# Patient Record
Sex: FEMALE | Race: WHITE | URBAN - METROPOLITAN AREA
[De-identification: names, ages, dates, MRNs, and addresses within clinical notes are randomized per-mention and may not be internally consistent; named-entity substitution may affect disease eponyms.]

---

## 2020-07-25 ENCOUNTER — TELEPHONE ENCOUNTER (OUTPATIENT)
Dept: URBAN - METROPOLITAN AREA CLINIC 13 | Facility: CLINIC | Age: 65
End: 2020-07-25

## 2020-07-25 RX ORDER — OMEPRAZOLE 20 MG/1
TAKE 1 CAPSULE TWICE DAILY CAPSULE, DELAYED RELEASE ORAL
Qty: 60 | Refills: 11 | OUTPATIENT
End: 2012-01-31

## 2020-07-25 RX ORDER — OMEPRAZOLE 40 MG/1
TAKE 1 CAPSULE TWICE DAILY CAPSULE, DELAYED RELEASE ORAL
Qty: 60 | Refills: 6 | OUTPATIENT
Start: 2011-12-19 | End: 2012-01-31

## 2020-07-25 RX ORDER — CHLORDIAZEPOXIDE HYDROCHLORIDE AND CLIDINIUM BROMIDE 5; 2.5 MG/1; MG/1
TAKE 1 CAPSULE EVERY 4-6 HOURS AS NEEDED CAPSULE ORAL
Refills: 0 | OUTPATIENT
Start: 2006-04-19 | End: 2009-03-03

## 2020-07-25 RX ORDER — HYOSCYAMINE SULFATE 0.38 MG/1
TAKE 1 TABLET TWICE DAILY TABLET, EXTENDED RELEASE ORAL
Refills: 0 | OUTPATIENT
Start: 2006-04-19 | End: 2008-06-06

## 2020-07-26 ENCOUNTER — TELEPHONE ENCOUNTER (OUTPATIENT)
Dept: URBAN - METROPOLITAN AREA CLINIC 13 | Facility: CLINIC | Age: 65
End: 2020-07-26

## 2020-07-26 RX ORDER — POTASSIUM CHLORIDE 750 MG/1
TABLET, FILM COATED, EXTENDED RELEASE ORAL
Qty: 60 | Refills: 0 | Status: ACTIVE | COMMUNITY
Start: 2012-02-07

## 2020-07-26 RX ORDER — PREDNISONE 10 MG/1
TABLET ORAL
Qty: 6 | Refills: 0 | Status: ACTIVE | COMMUNITY
Start: 2010-12-20

## 2020-07-26 RX ORDER — DULOXETINE HCL 30 MG
CAPSULE,DELAYED RELEASE (ENTERIC COATED) ORAL
Qty: 30 | Refills: 0 | Status: ACTIVE | COMMUNITY
Start: 2011-06-27

## 2020-07-26 RX ORDER — IPRATROPIUM BROMIDE 0.5 MG/2.5ML
USE 1 UNIT DOSE VIA NEBULIZER  4 TIMES A DAY AS NEEDED SOLUTION RESPIRATORY (INHALATION)
Refills: 0 | Status: ACTIVE | COMMUNITY

## 2020-07-26 RX ORDER — ALBUTEROL SULFATE 90 UG/1
INHALE 2 PUFFS FOUR TIMES DAILY AS DIRECTED AEROSOL, METERED RESPIRATORY (INHALATION)
Refills: 0 | Status: ACTIVE | COMMUNITY

## 2020-07-26 RX ORDER — ESTROGENS, CONJUGATED 0.45 MG/1
TAKE 1 TABLET DAILY TABLET, FILM COATED ORAL
Refills: 0 | Status: ACTIVE | COMMUNITY

## 2020-07-26 RX ORDER — OMEPRAZOLE 20 MG/1
TAKE 1 CAPSULE TWICE A DAY INSURANCE WILL ONLY PAY FOR 60/MONTH CAPSULE, DELAYED RELEASE ORAL
Qty: 120 | Refills: 0 | Status: ACTIVE | COMMUNITY
Start: 2012-01-31

## 2020-07-26 RX ORDER — OMEPRAZOLE 20 MG/1
TAKE 1 CAPSULE BY MOUTH TWICE A DAY CAPSULE, DELAYED RELEASE ORAL
Qty: 60 | Refills: 0 | Status: ACTIVE | COMMUNITY
Start: 2013-08-04

## 2020-07-26 RX ORDER — ALBUTEROL SULFATE 90 UG/1
AEROSOL, METERED RESPIRATORY (INHALATION)
Qty: 18 | Refills: 0 | Status: ACTIVE | COMMUNITY
Start: 2011-06-15

## 2020-07-26 RX ORDER — LEVOFLOXACIN 500 MG/1
TABLET, FILM COATED ORAL
Qty: 7 | Refills: 0 | Status: ACTIVE | COMMUNITY
Start: 2012-09-04

## 2020-07-26 RX ORDER — BUDESONIDE AND FORMOTEROL FUMARATE DIHYDRATE 160; 4.5 UG/1; UG/1
USE AS DIRECTED AEROSOL RESPIRATORY (INHALATION)
Refills: 0 | Status: ACTIVE | COMMUNITY

## 2020-07-26 RX ORDER — FLUTICASONE PROPIONATE 50 UG/1
USE AS DIRECTED SPRAY, METERED NASAL
Refills: 0 | Status: ACTIVE | COMMUNITY

## 2020-07-26 RX ORDER — LOPERAMIDE HCL 2 MG/1
USE AS DIRECTED TABLET ORAL
Refills: 0 | Status: ACTIVE | COMMUNITY

## 2020-07-26 RX ORDER — DEXTROSE 4 G
TAKE 1 TABLET DAILY TABLET,CHEWABLE ORAL
Refills: 0 | Status: ACTIVE | COMMUNITY

## 2020-07-26 RX ORDER — OMEPRAZOLE 20 MG/1
TAKE 1 CAPSULE BY MOUTH TWICE A DAY CAPSULE, DELAYED RELEASE ORAL
Qty: 120 | Refills: 0 | Status: ACTIVE | COMMUNITY
Start: 2013-04-03

## 2020-07-26 RX ORDER — OMEPRAZOLE 20 MG/1
TAKE 1 CAPSULE BY MOUTH TWICE A DAY CAPSULE, DELAYED RELEASE ORAL
Qty: 60 | Refills: 0 | Status: ACTIVE | COMMUNITY
Start: 2013-09-04

## 2020-07-26 RX ORDER — ALPRAZOLAM 1 MG/1
TAKE 1 TABLET DAILY TABLET ORAL
Refills: 0 | Status: ACTIVE | COMMUNITY

## 2020-07-26 RX ORDER — ACETAMINOPHEN 500 MG/1
TAKE 1 TABLET EVERY 4 TO 6 HOURS AS NEEDED TABLET, FILM COATED ORAL
Refills: 0 | Status: ACTIVE | COMMUNITY

## 2020-07-26 RX ORDER — PREDNISONE 10 MG/1
TABLET ORAL
Qty: 36 | Refills: 0 | Status: ACTIVE | COMMUNITY
Start: 2012-09-04

## 2020-07-26 RX ORDER — ALBUTEROL SULFATE 1.25 MG/3ML
USE 1 UNIT DOSE IN NEBULIZER EVERY 4 TO 6 HOURS AS NEEDED SOLUTION INTRABRONCHIAL
Refills: 0 | Status: ACTIVE | COMMUNITY

## 2020-07-26 RX ORDER — FUROSEMIDE 20 MG/1
TAKE 1 TABLET DAILY TABLET ORAL
Refills: 0 | Status: ACTIVE | COMMUNITY

## 2020-07-26 RX ORDER — MULTIVITAMIN
TAKE 1 CAPSULE DAILY CAPSULE ORAL
Refills: 0 | Status: ACTIVE | COMMUNITY

## 2020-07-26 RX ORDER — METRONIDAZOLE 250 MG/1
TABLET ORAL
Qty: 12 | Refills: 0 | Status: ACTIVE | COMMUNITY
Start: 2010-12-20

## 2020-07-26 RX ORDER — METOPROLOL TARTRATE 50 MG/1
TAKE 1 TABLET DAILY TABLET, FILM COATED ORAL
Refills: 0 | Status: ACTIVE | COMMUNITY

## 2020-07-26 RX ORDER — HYOSCYAMINE SULFATE 0.12 MG/1
PLACE 1-2 TABLET EVERY 6 HOURS PRN ABD PAIN TABLET, ORALLY DISINTEGRATING ORAL
Qty: 90 | Refills: 1 | Status: ACTIVE | COMMUNITY
Start: 2011-12-19

## 2020-07-26 RX ORDER — TIOTROPIUM BROMIDE 18 UG/1
USE AS DIRECTED CAPSULE ORAL; RESPIRATORY (INHALATION)
Refills: 0 | Status: ACTIVE | COMMUNITY

## 2020-07-26 RX ORDER — DULOXETINE HCL 30 MG
TAKE 1 CAPSULE DAILY CAPSULE,DELAYED RELEASE (ENTERIC COATED) ORAL
Refills: 0 | Status: ACTIVE | COMMUNITY

## 2020-07-26 RX ORDER — ASCORBIC ACID 500 MG
TAKE 1 TABLET DAILY TABLET ORAL
Refills: 0 | Status: ACTIVE | COMMUNITY

## 2020-07-26 RX ORDER — OMEPRAZOLE 20 MG/1
TAKE 1 CAPSULE BY MOUTH TWICE A DAY CAPSULE, DELAYED RELEASE ORAL
Qty: 120 | Refills: 0 | Status: ACTIVE | COMMUNITY
Start: 2013-05-31

## 2022-10-27 ENCOUNTER — WEB ENCOUNTER (OUTPATIENT)
Dept: URBAN - METROPOLITAN AREA CLINIC 113 | Facility: CLINIC | Age: 67
End: 2022-10-27

## 2022-10-27 ENCOUNTER — LAB OUTSIDE AN ENCOUNTER (OUTPATIENT)
Dept: URBAN - METROPOLITAN AREA CLINIC 113 | Facility: CLINIC | Age: 67
End: 2022-10-27

## 2022-10-27 ENCOUNTER — OFFICE VISIT (OUTPATIENT)
Dept: URBAN - METROPOLITAN AREA CLINIC 113 | Facility: CLINIC | Age: 67
End: 2022-10-27
Payer: MEDICARE

## 2022-10-27 VITALS
SYSTOLIC BLOOD PRESSURE: 113 MMHG | WEIGHT: 152 LBS | RESPIRATION RATE: 24 BRPM | DIASTOLIC BLOOD PRESSURE: 63 MMHG | BODY MASS INDEX: 29.84 KG/M2 | HEART RATE: 52 BPM | HEIGHT: 60 IN | TEMPERATURE: 97.8 F

## 2022-10-27 DIAGNOSIS — Z80.0 FAMILY HISTORY OF COLON CANCER: ICD-10-CM

## 2022-10-27 DIAGNOSIS — K58.9 IRRITABLE BOWEL SYNDROME (IBS): ICD-10-CM

## 2022-10-27 DIAGNOSIS — R74.8 ELEVATED LIVER ENZYMES: ICD-10-CM

## 2022-10-27 DIAGNOSIS — R19.7 DIARRHEA: ICD-10-CM

## 2022-10-27 PROCEDURE — 99204 OFFICE O/P NEW MOD 45 MIN: CPT | Performed by: INTERNAL MEDICINE

## 2022-10-27 RX ORDER — ESTROGENS, CONJUGATED 0.45 MG/1
TAKE 1 TABLET DAILY TABLET, FILM COATED ORAL
Refills: 0 | Status: ACTIVE | COMMUNITY

## 2022-10-27 RX ORDER — ALBUTEROL SULFATE 1.25 MG/3ML
USE 1 UNIT DOSE IN NEBULIZER EVERY 4 TO 6 HOURS AS NEEDED SOLUTION INTRABRONCHIAL
Refills: 0 | Status: ACTIVE | COMMUNITY

## 2022-10-27 RX ORDER — ASCORBIC ACID 500 MG
TAKE 1 TABLET DAILY TABLET ORAL
Refills: 0 | Status: ACTIVE | COMMUNITY

## 2022-10-27 RX ORDER — DULOXETINE HCL 30 MG
TAKE 1 CAPSULE DAILY CAPSULE,DELAYED RELEASE (ENTERIC COATED) ORAL
Refills: 0 | Status: ACTIVE | COMMUNITY

## 2022-10-27 RX ORDER — BUDESONIDE AND FORMOTEROL FUMARATE DIHYDRATE 160; 4.5 UG/1; UG/1
USE AS DIRECTED AEROSOL RESPIRATORY (INHALATION)
Refills: 0 | Status: ACTIVE | COMMUNITY

## 2022-10-27 RX ORDER — LEVOFLOXACIN 500 MG/1
TABLET, FILM COATED ORAL
Qty: 7 | Refills: 0 | Status: ACTIVE | COMMUNITY
Start: 2012-09-04

## 2022-10-27 RX ORDER — POTASSIUM CHLORIDE 750 MG/1
TABLET, FILM COATED, EXTENDED RELEASE ORAL
Qty: 60 | Refills: 0 | Status: ACTIVE | COMMUNITY
Start: 2012-02-07

## 2022-10-27 RX ORDER — METRONIDAZOLE 250 MG/1
TABLET ORAL
Qty: 12 | Refills: 0 | Status: ACTIVE | COMMUNITY
Start: 2010-12-20

## 2022-10-27 RX ORDER — MULTIVITAMIN
TAKE 1 CAPSULE DAILY CAPSULE ORAL
Refills: 0 | Status: ACTIVE | COMMUNITY

## 2022-10-27 RX ORDER — HYOSCYAMINE SULFATE 0.12 MG/1
PLACE 1-2 TABLET EVERY 6 HOURS PRN ABD PAIN TABLET, ORALLY DISINTEGRATING ORAL
Qty: 90 | Refills: 1 | Status: ACTIVE | COMMUNITY
Start: 2011-12-19

## 2022-10-27 RX ORDER — TIOTROPIUM BROMIDE 18 UG/1
USE AS DIRECTED CAPSULE ORAL; RESPIRATORY (INHALATION)
Refills: 0 | Status: ACTIVE | COMMUNITY

## 2022-10-27 RX ORDER — IPRATROPIUM BROMIDE 0.5 MG/2.5ML
USE 1 UNIT DOSE VIA NEBULIZER  4 TIMES A DAY AS NEEDED SOLUTION RESPIRATORY (INHALATION)
Refills: 0 | Status: ACTIVE | COMMUNITY

## 2022-10-27 RX ORDER — PREDNISONE 10 MG/1
TABLET ORAL
Qty: 6 | Refills: 0 | Status: ACTIVE | COMMUNITY
Start: 2010-12-20

## 2022-10-27 RX ORDER — DEXTROSE 4 G
TAKE 1 TABLET DAILY TABLET,CHEWABLE ORAL
Refills: 0 | Status: ACTIVE | COMMUNITY

## 2022-10-27 RX ORDER — METOPROLOL TARTRATE 50 MG/1
TAKE 1 TABLET DAILY TABLET, FILM COATED ORAL
Refills: 0 | Status: ACTIVE | COMMUNITY

## 2022-10-27 RX ORDER — ALPRAZOLAM 1 MG/1
TAKE 1 TABLET DAILY TABLET ORAL
Refills: 0 | Status: ACTIVE | COMMUNITY

## 2022-10-27 RX ORDER — FLUTICASONE PROPIONATE 50 UG/1
USE AS DIRECTED SPRAY, METERED NASAL
Refills: 0 | Status: ACTIVE | COMMUNITY

## 2022-10-27 RX ORDER — ALBUTEROL SULFATE 90 UG/1
INHALE 2 PUFFS FOUR TIMES DAILY AS DIRECTED AEROSOL, METERED RESPIRATORY (INHALATION)
Refills: 0 | Status: ACTIVE | COMMUNITY

## 2022-10-27 RX ORDER — ACETAMINOPHEN 500 MG/1
TAKE 1 TABLET EVERY 4 TO 6 HOURS AS NEEDED TABLET, FILM COATED ORAL
Refills: 0 | Status: ACTIVE | COMMUNITY

## 2022-10-27 RX ORDER — LOPERAMIDE HCL 2 MG/1
USE AS DIRECTED TABLET ORAL
Refills: 0 | Status: ACTIVE | COMMUNITY

## 2022-10-27 RX ORDER — OMEPRAZOLE 20 MG/1
TAKE 1 CAPSULE TWICE A DAY INSURANCE WILL ONLY PAY FOR 60/MONTH CAPSULE, DELAYED RELEASE ORAL
Qty: 120 | Refills: 0 | Status: ACTIVE | COMMUNITY
Start: 2012-01-31

## 2022-10-27 RX ORDER — FUROSEMIDE 20 MG/1
TAKE 1 TABLET DAILY TABLET ORAL
Refills: 0 | Status: ACTIVE | COMMUNITY

## 2022-10-27 NOTE — HPI-TODAY'S VISIT:
Very pleasant 67-year-old female with a history of severe irritable bowel syndrome presents for office follow-up.   . She has an elevated liver test.  She saw Dr. Chen.  He stopped her Depakote and Zyprexa.  The LFTs normalized.  Hx of longstanding irritable bowel syndrome.  No rectal bleeding or melena.  Daily loose stools.  She states irritable bowel syndrome is better these days.  Reflux is not as much of a problem for her.  No nausea or vomiting.  No hematemesis.  No rectal bleeding or melena.  Weight is relatively stable. . She had a severe viral viral pneumonitis back in 2010.  She had ARDS.  She had a trach.  Now she is on longstanding oxygen.  Her pulmonary function is actually relatively stable.  No cough problems.  She is not short of breath at rest in the office today.  She is wearing oxygen. . Colonoscopy 2008.  Normal . EGD 2002.  Mild gastritis.  Duodenal biopsies performed.  Duodenal biopsies normal.  Gastritis noted.  No H. pylori. . Colonoscopy 1999.  Normal. Slight inflammation of terminal ileum and ascending colon. Descending colon biopsies were normal. . Flexible sigmoidoscopy 1998.  Normal to 60 cm. Normal mucosa . Labs September 2022.  Total bilirubin 0.5, AST 21, ALT 19, alkaline phosphatase 110, hemoglobin 13.8, platelet count 216,000, . Labs July 2022.  AST 30, ALT 30, alkaline phosphatase 121. . CT abdomen and pelvis with contrast June 2022.  Previous cholecystectomy.  Normal common bile duct.  Normal liver.  Prior hysterectomy noted.  Tiny pulmonary nodule. . Labs May 2022.  AST 80, , alkaline phosphatase 225, hepatitis serologies negative . Abdominal ultrasound May 2022.  Normal liver.  Status post cholecystectomy.  Common bile duct normal. . Labs April 2022.  AST 82, , alkaline phosphatase 267, TSH 1.2

## 2022-10-28 LAB
FERRITIN, SERUM: 142
IMMUNOGLOBULIN A: 199
IMMUNOGLOBULIN A: 199
IMMUNOGLOBULIN G: 984
IMMUNOGLOBULIN M: 134
INTERPRETATION: (no result)
TISSUE TRANSGLUTAMINASE AB, IGA: <1

## 2022-12-12 ENCOUNTER — TELEPHONE ENCOUNTER (OUTPATIENT)
Dept: URBAN - METROPOLITAN AREA CLINIC 113 | Facility: CLINIC | Age: 67
End: 2022-12-12

## 2022-12-14 ENCOUNTER — OFFICE VISIT (OUTPATIENT)
Dept: URBAN - METROPOLITAN AREA MEDICAL CENTER 19 | Facility: MEDICAL CENTER | Age: 67
End: 2022-12-14
Payer: MEDICARE

## 2022-12-14 DIAGNOSIS — K64.8 BLEEDING INTERNAL HEMORRHOIDS: ICD-10-CM

## 2022-12-14 DIAGNOSIS — Z80.0 BROTHER AT YOUNG AGE FAMILY HISTORY OF COLON CANCER: ICD-10-CM

## 2022-12-14 DIAGNOSIS — D12.3 ADENOMA OF TRANSVERSE COLON: ICD-10-CM

## 2022-12-14 DIAGNOSIS — D12.0 ADENOMA OF CECUM: ICD-10-CM

## 2022-12-14 DIAGNOSIS — R19.7 ACUTE DIARRHEA: ICD-10-CM

## 2022-12-14 PROCEDURE — 45385 COLONOSCOPY W/LESION REMOVAL: CPT | Performed by: INTERNAL MEDICINE

## 2022-12-14 PROCEDURE — 45380 COLONOSCOPY AND BIOPSY: CPT | Performed by: INTERNAL MEDICINE

## 2022-12-14 RX ORDER — DULOXETINE HCL 30 MG
TAKE 1 CAPSULE DAILY CAPSULE,DELAYED RELEASE (ENTERIC COATED) ORAL
Refills: 0 | Status: ACTIVE | COMMUNITY

## 2022-12-14 RX ORDER — FUROSEMIDE 20 MG/1
TAKE 1 TABLET DAILY TABLET ORAL
Refills: 0 | Status: ACTIVE | COMMUNITY

## 2022-12-14 RX ORDER — POTASSIUM CHLORIDE 750 MG/1
TABLET, FILM COATED, EXTENDED RELEASE ORAL
Qty: 60 | Refills: 0 | Status: ACTIVE | COMMUNITY
Start: 2012-02-07

## 2022-12-14 RX ORDER — ALBUTEROL SULFATE 1.25 MG/3ML
USE 1 UNIT DOSE IN NEBULIZER EVERY 4 TO 6 HOURS AS NEEDED SOLUTION INTRABRONCHIAL
Refills: 0 | Status: ACTIVE | COMMUNITY

## 2022-12-14 RX ORDER — IPRATROPIUM BROMIDE 0.5 MG/2.5ML
USE 1 UNIT DOSE VIA NEBULIZER  4 TIMES A DAY AS NEEDED SOLUTION RESPIRATORY (INHALATION)
Refills: 0 | Status: ACTIVE | COMMUNITY

## 2022-12-14 RX ORDER — DEXTROSE 4 G
TAKE 1 TABLET DAILY TABLET,CHEWABLE ORAL
Refills: 0 | Status: ACTIVE | COMMUNITY

## 2022-12-14 RX ORDER — FLUTICASONE PROPIONATE 50 UG/1
USE AS DIRECTED SPRAY, METERED NASAL
Refills: 0 | Status: ACTIVE | COMMUNITY

## 2022-12-14 RX ORDER — ESTROGENS, CONJUGATED 0.45 MG/1
TAKE 1 TABLET DAILY TABLET, FILM COATED ORAL
Refills: 0 | Status: ACTIVE | COMMUNITY

## 2022-12-14 RX ORDER — METRONIDAZOLE 250 MG/1
TABLET ORAL
Qty: 12 | Refills: 0 | Status: ACTIVE | COMMUNITY
Start: 2010-12-20

## 2022-12-14 RX ORDER — OMEPRAZOLE 20 MG/1
TAKE 1 CAPSULE TWICE A DAY INSURANCE WILL ONLY PAY FOR 60/MONTH CAPSULE, DELAYED RELEASE ORAL
Qty: 120 | Refills: 0 | Status: ACTIVE | COMMUNITY
Start: 2012-01-31

## 2022-12-14 RX ORDER — ALBUTEROL SULFATE 90 UG/1
INHALE 2 PUFFS FOUR TIMES DAILY AS DIRECTED AEROSOL, METERED RESPIRATORY (INHALATION)
Refills: 0 | Status: ACTIVE | COMMUNITY

## 2022-12-14 RX ORDER — TIOTROPIUM BROMIDE 18 UG/1
USE AS DIRECTED CAPSULE ORAL; RESPIRATORY (INHALATION)
Refills: 0 | Status: ACTIVE | COMMUNITY

## 2022-12-14 RX ORDER — HYOSCYAMINE SULFATE 0.12 MG/1
PLACE 1-2 TABLET EVERY 6 HOURS PRN ABD PAIN TABLET, ORALLY DISINTEGRATING ORAL
Qty: 90 | Refills: 1 | Status: ACTIVE | COMMUNITY
Start: 2011-12-19

## 2022-12-14 RX ORDER — ACETAMINOPHEN 500 MG/1
TAKE 1 TABLET EVERY 4 TO 6 HOURS AS NEEDED TABLET, FILM COATED ORAL
Refills: 0 | Status: ACTIVE | COMMUNITY

## 2022-12-14 RX ORDER — METOPROLOL TARTRATE 50 MG/1
TAKE 1 TABLET DAILY TABLET, FILM COATED ORAL
Refills: 0 | Status: ACTIVE | COMMUNITY

## 2022-12-14 RX ORDER — LEVOFLOXACIN 500 MG/1
TABLET, FILM COATED ORAL
Qty: 7 | Refills: 0 | Status: ACTIVE | COMMUNITY
Start: 2012-09-04

## 2022-12-14 RX ORDER — PREDNISONE 10 MG/1
TABLET ORAL
Qty: 6 | Refills: 0 | Status: ACTIVE | COMMUNITY
Start: 2010-12-20

## 2022-12-14 RX ORDER — ALPRAZOLAM 1 MG/1
TAKE 1 TABLET DAILY TABLET ORAL
Refills: 0 | Status: ACTIVE | COMMUNITY

## 2022-12-14 RX ORDER — LOPERAMIDE HCL 2 MG/1
USE AS DIRECTED TABLET ORAL
Refills: 0 | Status: ACTIVE | COMMUNITY

## 2022-12-14 RX ORDER — BUDESONIDE AND FORMOTEROL FUMARATE DIHYDRATE 160; 4.5 UG/1; UG/1
USE AS DIRECTED AEROSOL RESPIRATORY (INHALATION)
Refills: 0 | Status: ACTIVE | COMMUNITY

## 2022-12-14 RX ORDER — ASCORBIC ACID 500 MG
TAKE 1 TABLET DAILY TABLET ORAL
Refills: 0 | Status: ACTIVE | COMMUNITY

## 2022-12-14 RX ORDER — MULTIVITAMIN
TAKE 1 CAPSULE DAILY CAPSULE ORAL
Refills: 0 | Status: ACTIVE | COMMUNITY

## 2022-12-20 ENCOUNTER — TELEPHONE ENCOUNTER (OUTPATIENT)
Dept: URBAN - METROPOLITAN AREA CLINIC 113 | Facility: CLINIC | Age: 67
End: 2022-12-20

## 2022-12-20 RX ORDER — BUDESONIDE 3 MG/1
3 CAPSULES CAPSULE ORAL ONCE A DAY
Qty: 90 | Refills: 2 | OUTPATIENT
Start: 2022-12-20

## 2023-02-27 ENCOUNTER — OFFICE VISIT (OUTPATIENT)
Dept: URBAN - METROPOLITAN AREA CLINIC 113 | Facility: CLINIC | Age: 68
End: 2023-02-27
Payer: MEDICARE

## 2023-02-27 VITALS — HEIGHT: 60 IN | WEIGHT: 151 LBS | TEMPERATURE: 97.3 F | BODY MASS INDEX: 29.64 KG/M2 | RESPIRATION RATE: 18 BRPM

## 2023-02-27 DIAGNOSIS — K52.831 COLLAGENOUS COLITIS: ICD-10-CM

## 2023-02-27 DIAGNOSIS — R19.7 DIARRHEA: ICD-10-CM

## 2023-02-27 DIAGNOSIS — K58.9 IRRITABLE BOWEL SYNDROME (IBS): ICD-10-CM

## 2023-02-27 DIAGNOSIS — Z80.0 FAMILY HISTORY OF COLON CANCER: ICD-10-CM

## 2023-02-27 DIAGNOSIS — R74.8 ELEVATED LIVER ENZYMES: ICD-10-CM

## 2023-02-27 PROCEDURE — 99214 OFFICE O/P EST MOD 30 MIN: CPT | Performed by: INTERNAL MEDICINE

## 2023-02-27 RX ORDER — ASCORBIC ACID 500 MG
TAKE 1 TABLET DAILY TABLET ORAL
Refills: 0 | Status: ACTIVE | COMMUNITY

## 2023-02-27 RX ORDER — BUDESONIDE 3 MG/1
3 CAPSULES CAPSULE ORAL ONCE A DAY
Qty: 90 | Refills: 2 | Status: ACTIVE | COMMUNITY
Start: 2022-12-20

## 2023-02-27 RX ORDER — DEXTROSE 4 G
TAKE 1 TABLET DAILY TABLET,CHEWABLE ORAL
Refills: 0 | Status: ACTIVE | COMMUNITY

## 2023-02-27 RX ORDER — AMLODIPINE BESYLATE 5 MG/1
1 TABLET TABLET ORAL ONCE A DAY
Status: ACTIVE | COMMUNITY

## 2023-02-27 RX ORDER — BUDESONIDE AND FORMOTEROL FUMARATE DIHYDRATE 160; 4.5 UG/1; UG/1
USE AS DIRECTED AEROSOL RESPIRATORY (INHALATION)
Refills: 0 | Status: ON HOLD | COMMUNITY

## 2023-02-27 RX ORDER — FLUTICASONE PROPIONATE 50 UG/1
USE AS DIRECTED SPRAY, METERED NASAL
Refills: 0 | Status: ACTIVE | COMMUNITY

## 2023-02-27 RX ORDER — IPRATROPIUM BROMIDE 0.5 MG/2.5ML
USE 1 UNIT DOSE VIA NEBULIZER  4 TIMES A DAY AS NEEDED SOLUTION RESPIRATORY (INHALATION)
Refills: 0 | Status: ACTIVE | COMMUNITY

## 2023-02-27 RX ORDER — POTASSIUM CHLORIDE 750 MG/1
TABLET, FILM COATED, EXTENDED RELEASE ORAL
Qty: 60 | Refills: 0 | Status: ACTIVE | COMMUNITY
Start: 2012-02-07

## 2023-02-27 RX ORDER — METRONIDAZOLE 250 MG/1
TABLET ORAL
Qty: 12 | Refills: 0 | Status: ACTIVE | COMMUNITY
Start: 2010-12-20

## 2023-02-27 RX ORDER — FUROSEMIDE 20 MG/1
TAKE 1 TABLET DAILY TABLET ORAL
Refills: 0 | Status: ACTIVE | COMMUNITY

## 2023-02-27 RX ORDER — DULOXETINE HCL 30 MG
TAKE 1 CAPSULE DAILY CAPSULE,DELAYED RELEASE (ENTERIC COATED) ORAL
Refills: 0 | Status: ACTIVE | COMMUNITY

## 2023-02-27 RX ORDER — ACETAMINOPHEN 500 MG/1
TAKE 1 TABLET EVERY 4 TO 6 HOURS AS NEEDED TABLET, FILM COATED ORAL
Refills: 0 | Status: ACTIVE | COMMUNITY

## 2023-02-27 RX ORDER — OMEPRAZOLE 20 MG/1
TAKE 1 CAPSULE TWICE A DAY INSURANCE WILL ONLY PAY FOR 60/MONTH CAPSULE, DELAYED RELEASE ORAL
Qty: 120 | Refills: 0 | Status: ACTIVE | COMMUNITY
Start: 2012-01-31

## 2023-02-27 RX ORDER — ALBUTEROL SULFATE 1.25 MG/3ML
USE 1 UNIT DOSE IN NEBULIZER EVERY 4 TO 6 HOURS AS NEEDED SOLUTION INTRABRONCHIAL
Refills: 0 | Status: ACTIVE | COMMUNITY

## 2023-02-27 RX ORDER — HYOSCYAMINE SULFATE 0.12 MG/1
PLACE 1-2 TABLET EVERY 6 HOURS PRN ABD PAIN TABLET, ORALLY DISINTEGRATING ORAL
Qty: 90 | Refills: 1 | Status: ACTIVE | COMMUNITY
Start: 2011-12-19

## 2023-02-27 RX ORDER — ESTROGENS, CONJUGATED 0.45 MG/1
TAKE 1 TABLET DAILY TABLET, FILM COATED ORAL
Refills: 0 | Status: ACTIVE | COMMUNITY

## 2023-02-27 RX ORDER — LOPERAMIDE HCL 2 MG/1
USE AS DIRECTED TABLET ORAL
Refills: 0 | Status: ACTIVE | COMMUNITY

## 2023-02-27 RX ORDER — PREDNISONE 10 MG/1
TABLET ORAL
Qty: 6 | Refills: 0 | Status: ACTIVE | COMMUNITY
Start: 2010-12-20

## 2023-02-27 RX ORDER — ALPRAZOLAM 1 MG/1
TAKE 1 TABLET DAILY TABLET ORAL
Refills: 0 | Status: ACTIVE | COMMUNITY

## 2023-02-27 RX ORDER — SERTRALINE 100 MG/1
1 TABLET TABLET, FILM COATED ORAL ONCE A DAY
Status: ACTIVE | COMMUNITY

## 2023-02-27 RX ORDER — LEVOFLOXACIN 500 MG/1
TABLET, FILM COATED ORAL
Qty: 7 | Refills: 0 | Status: ACTIVE | COMMUNITY
Start: 2012-09-04

## 2023-02-27 RX ORDER — MULTIVITAMIN
TAKE 1 CAPSULE DAILY CAPSULE ORAL
Refills: 0 | Status: ACTIVE | COMMUNITY

## 2023-02-27 RX ORDER — METOPROLOL TARTRATE 50 MG/1
TAKE 1 TABLET DAILY TABLET, FILM COATED ORAL
Refills: 0 | Status: ACTIVE | COMMUNITY

## 2023-02-27 RX ORDER — ALBUTEROL SULFATE 90 UG/1
INHALE 2 PUFFS FOUR TIMES DAILY AS DIRECTED AEROSOL, METERED RESPIRATORY (INHALATION)
Refills: 0 | Status: ACTIVE | COMMUNITY

## 2023-02-27 RX ORDER — TIOTROPIUM BROMIDE 18 UG/1
USE AS DIRECTED CAPSULE ORAL; RESPIRATORY (INHALATION)
Refills: 0 | Status: ACTIVE | COMMUNITY

## 2023-02-27 NOTE — HPI-TODAY'S VISIT:
Very pleasant 67-year-old female with a history of severe irritable bowel syndrome presents for office follow-up.   . Since her last office visit she had a colonoscopy and she was diagnosed with colitis colitis.  Treated with budesonide.  Thankfully her diarrhea is much improved.  She follows the FODMAP diet.  She is found that avoiding lactose helps quite a bit.  She is not having any issues with rectal bleeding or melena.  Clearly symptoms are much improved at this time.  Weight is relatively stable. . She had elevated liver tests.  She saw Dr. Chen.  He stopped her Depakote and Zyprexa.  The LFTs normalized.   . Hx of longstanding irritable bowel syndrome.  In the past, severe. . She has eliminated lactose and this has helped her chronic diarrhea issue considerably. . She had a severe viral viral pneumonitis back in 2010.  She had ARDS.  She had a trach.  Now she is on longstanding oxygen.  Her pulmonary function is actually relatively stable.  No cough problems.  She is on home O2.

## 2023-02-27 NOTE — HPI-TODAY'S VISIT:
Colonoscopy December 2022.  4 distinct 4 mm transverse and cecal polyps.  All tubular adenomas.  Random biopsies obtained.  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  Ascending biopsies were normal. . Oct 2022. Celiac serology negative. . Labs September 2022.  Total bilirubin 0.5, AST 21, ALT 19, alkaline phosphatase 110, hemoglobin 13.8, platelet count 216,000, . Labs July 2022.  AST 30, ALT 30, alkaline phosphatase 121. . CT abdomen and pelvis with contrast June 2022.  Previous cholecystectomy.  Normal common bile duct.  Normal liver.  Prior hysterectomy noted.  Tiny pulmonary nodule. . Labs May 2022.  AST 80, , alkaline phosphatase 225, hepatitis serologies negative . Abdominal ultrasound May 2022.  Normal liver.  Status post cholecystectomy.  Common bile duct normal. . Labs April 2022.  AST 82, , alkaline phosphatase 267, TSH 1.2. Colonoscopy 2008.  Normal . EGD 2002.  Mild gastritis.  Duodenal biopsies performed.  Duodenal biopsies normal.  Gastritis noted.  No H. pylori. . Colonoscopy 1999.  Normal. Slight inflammation of terminal ileum and ascending colon. Descending colon biopsies were normal. . Flexible sigmoidoscopy 1998.  Normal to 60 cm. Normal mucosa .

## 2023-02-28 PROBLEM — 19311003 COLLAGENOUS COLITIS: Status: ACTIVE | Noted: 2023-02-27

## 2023-02-28 PROBLEM — 312824007 FAMILY HISTORY OF CANCER OF COLON (SITUATION): Status: ACTIVE | Noted: 2022-10-27

## 2023-02-28 PROBLEM — 707724006 ELEVATED LIVER ENZYMES LEVEL: Status: ACTIVE | Noted: 2022-10-27

## 2023-02-28 PROBLEM — 62315008 DIARRHEA: Status: ACTIVE | Noted: 2022-10-27

## 2023-02-28 PROBLEM — 10743008 IRRITABLE BOWEL SYNDROME: Status: ACTIVE | Noted: 2022-10-27

## 2023-04-20 ENCOUNTER — ERX REFILL RESPONSE (OUTPATIENT)
Dept: URBAN - METROPOLITAN AREA CLINIC 113 | Facility: CLINIC | Age: 68
End: 2023-04-20

## 2023-04-20 RX ORDER — BUDESONIDE 3 MG/1
3 CAPSULES CAPSULE ORAL ONCE A DAY
Qty: 90 | Refills: 2 | OUTPATIENT

## 2023-04-20 RX ORDER — BUDESONIDE 3 MG/1
TAKE 3 CAPSULES BY MOUTH EVERY DAY, DECREASE TO TWO (2) CAPSULES IN FOUR (4) WEEKS CAPSULE, COATED PELLETS ORAL
Qty: 90 BLISTER | Refills: 2 | OUTPATIENT

## 2023-06-19 ENCOUNTER — LAB OUTSIDE AN ENCOUNTER (OUTPATIENT)
Dept: URBAN - METROPOLITAN AREA CLINIC 113 | Facility: CLINIC | Age: 68
End: 2023-06-19

## 2023-06-19 ENCOUNTER — TELEPHONE ENCOUNTER (OUTPATIENT)
Dept: URBAN - METROPOLITAN AREA CLINIC 113 | Facility: CLINIC | Age: 68
End: 2023-06-19

## 2023-06-23 ENCOUNTER — TELEPHONE ENCOUNTER (OUTPATIENT)
Dept: URBAN - METROPOLITAN AREA CLINIC 13 | Facility: CLINIC | Age: 68
End: 2023-06-23

## 2023-07-25 ENCOUNTER — TELEPHONE ENCOUNTER (OUTPATIENT)
Dept: URBAN - METROPOLITAN AREA CLINIC 113 | Facility: CLINIC | Age: 68
End: 2023-07-25

## 2023-07-25 ENCOUNTER — LAB OUTSIDE AN ENCOUNTER (OUTPATIENT)
Dept: URBAN - METROPOLITAN AREA CLINIC 113 | Facility: CLINIC | Age: 68
End: 2023-07-25

## 2023-08-03 ENCOUNTER — LAB OUTSIDE AN ENCOUNTER (OUTPATIENT)
Dept: URBAN - METROPOLITAN AREA CLINIC 113 | Facility: CLINIC | Age: 68
End: 2023-08-03

## 2023-08-03 ENCOUNTER — TELEPHONE ENCOUNTER (OUTPATIENT)
Dept: URBAN - METROPOLITAN AREA CLINIC 113 | Facility: CLINIC | Age: 68
End: 2023-08-03

## 2023-08-14 ENCOUNTER — LAB OUTSIDE AN ENCOUNTER (OUTPATIENT)
Dept: URBAN - METROPOLITAN AREA CLINIC 113 | Facility: CLINIC | Age: 68
End: 2023-08-14

## 2023-08-14 ENCOUNTER — WEB ENCOUNTER (OUTPATIENT)
Dept: URBAN - METROPOLITAN AREA CLINIC 113 | Facility: CLINIC | Age: 68
End: 2023-08-14

## 2023-08-14 ENCOUNTER — OFFICE VISIT (OUTPATIENT)
Dept: URBAN - METROPOLITAN AREA CLINIC 113 | Facility: CLINIC | Age: 68
End: 2023-08-14
Payer: MEDICARE

## 2023-08-14 VITALS
WEIGHT: 149 LBS | TEMPERATURE: 97.7 F | BODY MASS INDEX: 29.25 KG/M2 | HEART RATE: 56 BPM | HEIGHT: 60 IN | DIASTOLIC BLOOD PRESSURE: 55 MMHG | RESPIRATION RATE: 18 BRPM | SYSTOLIC BLOOD PRESSURE: 114 MMHG

## 2023-08-14 DIAGNOSIS — R01.1 HEART MURMUR: ICD-10-CM

## 2023-08-14 DIAGNOSIS — R10.84 GENERALIZED ABDOMINAL PAIN: ICD-10-CM

## 2023-08-14 DIAGNOSIS — R74.8 ELEVATED LIVER ENZYMES: ICD-10-CM

## 2023-08-14 PROCEDURE — 99214 OFFICE O/P EST MOD 30 MIN: CPT | Performed by: INTERNAL MEDICINE

## 2023-08-14 RX ORDER — ASCORBIC ACID 500 MG
TAKE 1 TABLET DAILY TABLET ORAL
Refills: 0 | Status: ACTIVE | COMMUNITY

## 2023-08-14 RX ORDER — METOPROLOL TARTRATE 50 MG/1
TAKE 1 TABLET DAILY TABLET, FILM COATED ORAL
Refills: 0 | Status: ACTIVE | COMMUNITY

## 2023-08-14 RX ORDER — ALBUTEROL SULFATE 1.25 MG/3ML
USE 1 UNIT DOSE IN NEBULIZER EVERY 4 TO 6 HOURS AS NEEDED SOLUTION INTRABRONCHIAL
Refills: 0 | Status: ACTIVE | COMMUNITY

## 2023-08-14 RX ORDER — OMEPRAZOLE 20 MG/1
TAKE 1 CAPSULE TWICE A DAY INSURANCE WILL ONLY PAY FOR 60/MONTH CAPSULE, DELAYED RELEASE ORAL
Qty: 120 | Refills: 0 | Status: ACTIVE | COMMUNITY
Start: 2012-01-31

## 2023-08-14 RX ORDER — MULTIVITAMIN
TAKE 1 CAPSULE DAILY CAPSULE ORAL
Refills: 0 | Status: ACTIVE | COMMUNITY

## 2023-08-14 RX ORDER — AMLODIPINE BESYLATE 5 MG/1
1 TABLET TABLET ORAL ONCE A DAY
Status: ACTIVE | COMMUNITY

## 2023-08-14 RX ORDER — ALBUTEROL SULFATE 90 UG/1
INHALE 2 PUFFS FOUR TIMES DAILY AS DIRECTED AEROSOL, METERED RESPIRATORY (INHALATION)
Refills: 0 | Status: ACTIVE | COMMUNITY

## 2023-08-14 RX ORDER — BUDESONIDE AND FORMOTEROL FUMARATE DIHYDRATE 160; 4.5 UG/1; UG/1
USE AS DIRECTED AEROSOL RESPIRATORY (INHALATION)
Refills: 0 | Status: ACTIVE | COMMUNITY

## 2023-08-14 RX ORDER — FLUTICASONE PROPIONATE 50 UG/1
USE AS DIRECTED SPRAY, METERED NASAL
Refills: 0 | Status: ACTIVE | COMMUNITY

## 2023-08-14 RX ORDER — ALPRAZOLAM 1 MG/1
TAKE 1 TABLET DAILY TABLET ORAL
Refills: 0 | Status: ACTIVE | COMMUNITY

## 2023-08-14 RX ORDER — BUDESONIDE 3 MG/1
TAKE 3 CAPSULES BY MOUTH EVERY DAY, DECREASE TO TWO (2) CAPSULES IN FOUR (4) WEEKS CAPSULE, COATED PELLETS ORAL
Qty: 90 BLISTER | Refills: 2 | Status: ACTIVE | COMMUNITY

## 2023-08-14 RX ORDER — FLUOXETINE 20 MG/1
1 CAPSULE CAPSULE ORAL ONCE A DAY
Status: ACTIVE | COMMUNITY

## 2023-08-14 RX ORDER — DICYCLOMINE HYDROCHLORIDE 20 MG/1
1 TABLET TABLET ORAL THREE TIMES A DAY
Status: ACTIVE | COMMUNITY

## 2023-08-14 NOTE — HPI-TODAY'S VISIT:
Very pleasant 68-year-old female with a history of severe irritable bowel syndrome presents for office follow-up.   . She is no longer on the seizure medications.  She has intermittent generalized periumbilical abdominal pain which comes and goes.  She does not have heartburn or regurgitation.  She still has considerable issues with chronic cough with sputum production.  She is short of breath at baseline.  She states her diarrhea is better.  There is no rectal bleeding or melena.  Weight is stable.  She remains on budesonide 2 tablets/day. . She had elevated liver tests.  She saw Dr. Chen.  He stopped her Depakote and Zyprexa.  The LFTs normalized.   . Hx of longstanding irritable bowel syndrome.  In the past, severe. . She has eliminated lactose and this has helped her chronic diarrhea issue considerably. . She had a severe viral viral pneumonitis back in 2010.  She had ARDS.  She had a trach.  Now she is on longstanding oxygen.  Her pulmonary function is actually relatively stable.  No cough problems.  She is on home O2.

## 2023-08-14 NOTE — HPI-TODAY'S VISIT:
. Colonoscopy December 2022.  4 distinct 4 mm transverse and cecal polyps.  All tubular adenomas.  Random biopsies obtained.  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  Ascending biopsies were normal. . Oct 2022. Celiac serology negative. . Labs September 2022.  Total bilirubin 0.5, AST 21, ALT 19, alkaline phosphatase 110, hemoglobin 13.8, platelet count 216,000, . Labs July 2022.  AST 30, ALT 30, alkaline phosphatase 121. . CT abdomen and pelvis with contrast June 2022.  Previous cholecystectomy.  Normal common bile duct.  Normal liver.  Prior hysterectomy noted.  Tiny pulmonary nodule. . Labs May 2022.  AST 80, , alkaline phosphatase 225, hepatitis serologies negative . Abdominal ultrasound May 2022.  Normal liver.  Status post cholecystectomy.  Common bile duct normal. . Labs April 2022.  AST 82, , alkaline phosphatase 267, TSH 1.2. Colonoscopy 2008.  Normal . EGD 2002.  Mild gastritis.  Duodenal biopsies performed.  Duodenal biopsies normal.  Gastritis noted.  No H. pylori. . Colonoscopy 1999.  Normal. Slight inflammation of terminal ileum and ascending colon. Descending colon biopsies were normal. . Flexible sigmoidoscopy 1998.  Normal to 60 cm. Normal mucosa . CT abdomen with contrast.  Mild intrahepatic ductal dilation.  No intrahepatic mass.  Common bile duct 9 mm.  No other abnormalities noted. . July 31, 2023.  Total bilirubin 0.5.  Alk phos is 119, AST 25.  TSH 1.2.  Hemoglobin 13.0.  Platelet count 202,000.  .  CRP 0.6.  Creatinine 1.1.  Prothrombin time 12.7. . Labs July 21, 2023.  , .  Alkaline phosphatase 240.  Total bili 1.2. . Abdominal ultrasound June 2023.  Normal.  Gallbladder surgically absent.  Common bile duct 4 mm. . June 1, 2023.  Alk phos is 388.  .  .

## 2023-09-20 ENCOUNTER — TELEPHONE ENCOUNTER (OUTPATIENT)
Dept: URBAN - METROPOLITAN AREA CLINIC 113 | Facility: CLINIC | Age: 68
End: 2023-09-20

## 2024-01-23 ENCOUNTER — OFFICE VISIT (OUTPATIENT)
Dept: URBAN - METROPOLITAN AREA CLINIC 113 | Facility: CLINIC | Age: 69
End: 2024-01-23

## 2024-04-01 ENCOUNTER — OV EP (OUTPATIENT)
Dept: URBAN - METROPOLITAN AREA CLINIC 113 | Facility: CLINIC | Age: 69
End: 2024-04-01
Payer: MEDICARE

## 2024-04-01 VITALS
SYSTOLIC BLOOD PRESSURE: 117 MMHG | HEIGHT: 60 IN | HEART RATE: 46 BPM | RESPIRATION RATE: 22 BRPM | TEMPERATURE: 97.5 F | WEIGHT: 149.6 LBS | DIASTOLIC BLOOD PRESSURE: 45 MMHG | BODY MASS INDEX: 29.37 KG/M2

## 2024-04-01 DIAGNOSIS — R19.7 DIARRHEA: ICD-10-CM

## 2024-04-01 DIAGNOSIS — K58.1 IBS: ICD-10-CM

## 2024-04-01 DIAGNOSIS — K52.831 COLLAGENOUS COLITIS: ICD-10-CM

## 2024-04-01 DIAGNOSIS — R10.84 GENERALIZED ABDOMINAL PAIN: ICD-10-CM

## 2024-04-01 PROCEDURE — 99214 OFFICE O/P EST MOD 30 MIN: CPT | Performed by: INTERNAL MEDICINE

## 2024-04-01 RX ORDER — MULTIVITAMIN
TAKE 1 CAPSULE DAILY CAPSULE ORAL
Refills: 0 | Status: ACTIVE | COMMUNITY

## 2024-04-01 RX ORDER — ASCORBIC ACID 500 MG
TAKE 1 TABLET DAILY TABLET ORAL
Refills: 0 | Status: ACTIVE | COMMUNITY

## 2024-04-01 RX ORDER — ALPRAZOLAM 1 MG/1
1 TABLET TABLET ORAL THREE TIMES A DAY
Refills: 0 | Status: ACTIVE | COMMUNITY

## 2024-04-01 RX ORDER — ALBUTEROL SULFATE 1.25 MG/3ML
USE 1 UNIT DOSE IN NEBULIZER EVERY 4 TO 6 HOURS AS NEEDED SOLUTION INTRABRONCHIAL
Refills: 0 | Status: ACTIVE | COMMUNITY

## 2024-04-01 RX ORDER — METOPROLOL TARTRATE 50 MG/1
TAKE 1 TABLET DAILY TABLET, FILM COATED ORAL
Refills: 0 | Status: ACTIVE | COMMUNITY

## 2024-04-01 RX ORDER — DICYCLOMINE HYDROCHLORIDE 20 MG/1
1 TABLET TABLET ORAL THREE TIMES A DAY
Status: ACTIVE | COMMUNITY

## 2024-04-01 RX ORDER — BUDESONIDE AND FORMOTEROL FUMARATE DIHYDRATE 160; 4.5 UG/1; UG/1
USE AS DIRECTED AEROSOL RESPIRATORY (INHALATION)
Refills: 0 | Status: ACTIVE | COMMUNITY

## 2024-04-01 RX ORDER — FLUTICASONE PROPIONATE 50 UG/1
USE AS DIRECTED SPRAY, METERED NASAL
Refills: 0 | Status: ACTIVE | COMMUNITY

## 2024-04-01 RX ORDER — ALBUTEROL SULFATE 90 UG/1
INHALE 2 PUFFS FOUR TIMES DAILY AS DIRECTED AEROSOL, METERED RESPIRATORY (INHALATION)
Refills: 0 | Status: ACTIVE | COMMUNITY

## 2024-04-01 RX ORDER — AMLODIPINE BESYLATE 5 MG/1
1 TABLET TABLET ORAL ONCE A DAY
Status: ACTIVE | COMMUNITY

## 2024-04-01 RX ORDER — BUDESONIDE 3 MG/1
TAKE 3 CAPSULES BY MOUTH EVERY DAY, DECREASE TO TWO (2) CAPSULES IN FOUR (4) WEEKS CAPSULE, COATED PELLETS ORAL
Qty: 90 BLISTER | Refills: 2 | Status: ACTIVE | COMMUNITY

## 2024-04-01 RX ORDER — FLUOXETINE 20 MG/1
1 CAPSULE CAPSULE ORAL ONCE A DAY
Status: ACTIVE | COMMUNITY

## 2024-04-01 RX ORDER — OMEPRAZOLE 20 MG/1
TAKE 1 CAPSULE TWICE A DAY INSURANCE WILL ONLY PAY FOR 60/MONTH CAPSULE, DELAYED RELEASE ORAL
Qty: 120 | Refills: 0 | Status: ACTIVE | COMMUNITY
Start: 2012-01-31

## 2024-04-01 NOTE — HPI-TODAY'S VISIT:
. January 2024. CMP notable for alkaline phosphatase 308. Total bilirubin 0.5. AST 71, ALT 98.  Labs September 2023. Hemoglobin 13.0. Platelet count 186,000. ALT 31, AST 34. Alkaline phosphatase 67. Total bilirubin 1.0.  September 2023. Carotid ultrasound study negative for carotid artery stenosis.  September 2023. MRI brain normal.  CT scan of pelvis with contrast September 2023. Liver normal. Gallbladder surgically absent. Pancreas normal. No mention of common bile duct dilation.  2023 CT abdomen with contrast. Mild intrahepatic ductal dilation. No intrahepatic mass. Common bile duct 9 mm. No other abnormalities noted.  July 31, 2023. Total bilirubin 0.5. Alk phos is 119, AST 25. TSH 1.2. Hemoglobin 13.0. Platelet count 202,000. . CRP 0.6. Creatinine 1.1. Prothrombin time 12.7.  Labs July 21, 2023. , . Alkaline phosphatase 240. Total bili 1.2.  Abdominal ultrasound June 2023. Normal. Gallbladder surgically absent. Common bile duct 4 mm.  June 1, 2023. Alk phos is 388. .   CT scan abdomen with contrast. August 2023. No acute abdominal findings. 9 mm common bile duct.  July 2023. Labs. Normal IgG at 995. Anti-LK M-. Anti-smooth muscle antibody negative. Hepatitis serologies negative. Antimitochondrial antibody negative.  Abdominal ultrasound July 2023. Unremarkable. Gallbladder surgically absent. Common bile duct 4 mm in size.  Colonoscopy December 2022.  4 distinct 4 mm transverse and cecal polyps.  All tubular adenomas.  Random biopsies obtained.  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  Ascending biopsies were normal.  Oct 2022. Celiac serology negative.  Labs September 2022.  Total bilirubin 0.5, AST 21, ALT 19, alkaline phosphatase 110, hemoglobin 13.8, platelet count 216,000,  Labs July 2022.  AST 30, ALT 30, alkaline phosphatase 121.  CT abdomen and pelvis with contrast June 2022.  Previous cholecystectomy.  Normal common bile duct.  Normal liver.  Prior hysterectomy noted.  Tiny pulmonary nodule.  Labs May 2022.  AST 80, , alkaline phosphatase 225, hepatitis serologies negative  CT chest with contrast. June 2022. Focal masslike thickening of the distal esophagus with calcification. 13 mm x 10 mm.  Abdominal ultrasound May 2022.  Normal liver.  Status post cholecystectomy.  Common bile duct normal.  Labs April 2022.  AST 82, , alkaline phosphatase 267, TSH 1.2.  Colonoscopy 2008. Normal  EGD 2002. Mild gastritis. Duodenal biopsies performed. Duodenal biopsies normal. Gastritis noted. No H. pylori.  Colonoscopy 1999. Normal. Slight inflammation of terminal ileum and ascending colon. Descending colon biopsies were normal.  Flexible sigmoidoscopy 1998. Normal to 60 cm. Normal mucosa

## 2024-04-01 NOTE — HPI-TODAY'S VISIT:
Very pleasant 68-year-old female with a history of severe irritable bowel syndrome presents for office follow-up.   . Today she is doing fine. She did well in the fall and winter 2023. Recently she has been having some problems with periodic epigastric and right upper quadrant discomfort. No dysphagia. Shortness of breath is stable. She is on home oxygen but looks very comfortable today. No cough or hemoptysis. No nausea or vomiting. Weight is stable. No diarrhea or constipation. No rectal bleeding or melena. Her diarrhea had resolved as I understand. I do not think she was on budesonide. Then the diarrhea returned. Her primary care doctor put her on budesonide. However she is only taking 1 tablet a day. At this time she is not having diarrhea issues. . She had elevated liver tests.  She saw Dr. Chen.  He stopped her Depakote and Zyprexa.  The LFTs normalized.   . Hx of longstanding irritable bowel syndrome.  In the past, severe. . She has eliminated lactose and this has helped her chronic diarrhea issue considerably. . She had a severe viral viral pneumonitis back in 2010.  She had ARDS.  She had a trach.  Now she is on longstanding oxygen.  Her pulmonary function is actually relatively stable.  No cough problems.  She is on home O2.

## 2024-04-01 NOTE — PHYSICAL EXAM CHEST:
no lesions,  no deformities,  no traumatic injuries,  no significant scars are present,  chest wall non-tender,  no masses present, breathing is unlabored without accessory muscle use,normal breath sounds Yes Yes Yes

## 2024-04-03 ENCOUNTER — LAB (OUTPATIENT)
Dept: URBAN - METROPOLITAN AREA CLINIC 113 | Facility: CLINIC | Age: 69
End: 2024-04-03

## 2024-04-29 ENCOUNTER — EUS (OUTPATIENT)
Dept: URBAN - METROPOLITAN AREA MEDICAL CENTER 19 | Facility: MEDICAL CENTER | Age: 69
End: 2024-04-29

## 2024-04-29 RX ORDER — ALPRAZOLAM 1 MG/1
1 TABLET TABLET ORAL THREE TIMES A DAY
Refills: 0 | Status: ACTIVE | COMMUNITY

## 2024-04-29 RX ORDER — ALBUTEROL SULFATE 90 UG/1
INHALE 2 PUFFS FOUR TIMES DAILY AS DIRECTED AEROSOL, METERED RESPIRATORY (INHALATION)
Refills: 0 | Status: ACTIVE | COMMUNITY

## 2024-04-29 RX ORDER — ALBUTEROL SULFATE 1.25 MG/3ML
USE 1 UNIT DOSE IN NEBULIZER EVERY 4 TO 6 HOURS AS NEEDED SOLUTION INTRABRONCHIAL
Refills: 0 | Status: ACTIVE | COMMUNITY

## 2024-04-29 RX ORDER — MULTIVITAMIN
TAKE 1 CAPSULE DAILY CAPSULE ORAL
Refills: 0 | Status: ACTIVE | COMMUNITY

## 2024-04-29 RX ORDER — FLUOXETINE 20 MG/1
1 CAPSULE CAPSULE ORAL ONCE A DAY
Status: ACTIVE | COMMUNITY

## 2024-04-29 RX ORDER — OMEPRAZOLE 20 MG/1
TAKE 1 CAPSULE TWICE A DAY INSURANCE WILL ONLY PAY FOR 60/MONTH CAPSULE, DELAYED RELEASE ORAL
Qty: 120 | Refills: 0 | Status: ACTIVE | COMMUNITY
Start: 2012-01-31

## 2024-04-29 RX ORDER — BUDESONIDE 3 MG/1
TAKE 3 CAPSULES BY MOUTH EVERY DAY, DECREASE TO TWO (2) CAPSULES IN FOUR (4) WEEKS CAPSULE, COATED PELLETS ORAL
Qty: 90 BLISTER | Refills: 2 | Status: ACTIVE | COMMUNITY

## 2024-04-29 RX ORDER — ASCORBIC ACID 500 MG
TAKE 1 TABLET DAILY TABLET ORAL
Refills: 0 | Status: ACTIVE | COMMUNITY

## 2024-04-29 RX ORDER — DICYCLOMINE HYDROCHLORIDE 20 MG/1
1 TABLET TABLET ORAL THREE TIMES A DAY
Status: ACTIVE | COMMUNITY

## 2024-04-29 RX ORDER — BUDESONIDE AND FORMOTEROL FUMARATE DIHYDRATE 160; 4.5 UG/1; UG/1
USE AS DIRECTED AEROSOL RESPIRATORY (INHALATION)
Refills: 0 | Status: ACTIVE | COMMUNITY

## 2024-04-29 RX ORDER — FLUTICASONE PROPIONATE 50 UG/1
USE AS DIRECTED SPRAY, METERED NASAL
Refills: 0 | Status: ACTIVE | COMMUNITY

## 2024-04-29 RX ORDER — AMLODIPINE BESYLATE 5 MG/1
1 TABLET TABLET ORAL ONCE A DAY
Status: ACTIVE | COMMUNITY

## 2024-04-29 RX ORDER — METOPROLOL TARTRATE 50 MG/1
TAKE 1 TABLET DAILY TABLET, FILM COATED ORAL
Refills: 0 | Status: ACTIVE | COMMUNITY

## 2024-05-06 ENCOUNTER — TELEPHONE ENCOUNTER (OUTPATIENT)
Dept: URBAN - METROPOLITAN AREA CLINIC 113 | Facility: CLINIC | Age: 69
End: 2024-05-06

## 2024-09-05 ENCOUNTER — OFFICE VISIT (OUTPATIENT)
Dept: URBAN - METROPOLITAN AREA CLINIC 113 | Facility: CLINIC | Age: 69
End: 2024-09-05

## 2024-09-19 ENCOUNTER — LAB OUTSIDE AN ENCOUNTER (OUTPATIENT)
Dept: URBAN - METROPOLITAN AREA CLINIC 113 | Facility: CLINIC | Age: 69
End: 2024-09-19

## 2024-09-19 ENCOUNTER — OFFICE VISIT (OUTPATIENT)
Dept: URBAN - METROPOLITAN AREA CLINIC 113 | Facility: CLINIC | Age: 69
End: 2024-09-19
Payer: MEDICARE

## 2024-09-19 ENCOUNTER — DASHBOARD ENCOUNTERS (OUTPATIENT)
Age: 69
End: 2024-09-19

## 2024-09-19 VITALS
DIASTOLIC BLOOD PRESSURE: 73 MMHG | TEMPERATURE: 97.2 F | SYSTOLIC BLOOD PRESSURE: 147 MMHG | WEIGHT: 161 LBS | HEART RATE: 57 BPM | HEIGHT: 60 IN | BODY MASS INDEX: 31.61 KG/M2 | RESPIRATION RATE: 22 BRPM

## 2024-09-19 DIAGNOSIS — R74.8 ELEVATED LIVER ENZYMES: ICD-10-CM

## 2024-09-19 DIAGNOSIS — R19.7 DIARRHEA OF PRESUMED INFECTIOUS ORIGIN: ICD-10-CM

## 2024-09-19 DIAGNOSIS — R10.84 GENERALIZED ABDOMINAL PAIN: ICD-10-CM

## 2024-09-19 PROCEDURE — 99214 OFFICE O/P EST MOD 30 MIN: CPT | Performed by: NURSE PRACTITIONER

## 2024-09-19 RX ORDER — BUDESONIDE 3 MG/1
TAKE 3 CAPSULES BY MOUTH EVERY DAY, DECREASE TO TWO (2) CAPSULES IN FOUR (4) WEEKS CAPSULE, COATED PELLETS ORAL
Qty: 90 BLISTER | Refills: 2 | Status: ACTIVE | COMMUNITY

## 2024-09-19 RX ORDER — BUDESONIDE AND FORMOTEROL FUMARATE DIHYDRATE 160; 4.5 UG/1; UG/1
USE AS DIRECTED AEROSOL RESPIRATORY (INHALATION)
Refills: 0 | Status: ACTIVE | COMMUNITY

## 2024-09-19 RX ORDER — FLUOXETINE HYDROCHLORIDE 10 MG/1
1 CAPSULE CAPSULE ORAL ONCE A DAY
Status: ACTIVE | COMMUNITY

## 2024-09-19 RX ORDER — METOPROLOL TARTRATE 50 MG/1
TAKE 1 TABLET DAILY TABLET, FILM COATED ORAL
Refills: 0 | Status: ACTIVE | COMMUNITY

## 2024-09-19 RX ORDER — DICYCLOMINE HYDROCHLORIDE 20 MG/1
1 TABLET TABLET ORAL THREE TIMES A DAY
Status: ACTIVE | COMMUNITY

## 2024-09-19 RX ORDER — OMEPRAZOLE 20 MG/1
TAKE 1 CAPSULE TWICE A DAY INSURANCE WILL ONLY PAY FOR 60/MONTH CAPSULE, DELAYED RELEASE ORAL
Qty: 120 | Refills: 0 | Status: ACTIVE | COMMUNITY
Start: 2012-01-31

## 2024-09-19 RX ORDER — ASCORBIC ACID 500 MG
TAKE 1 TABLET DAILY TABLET ORAL
Refills: 0 | Status: ACTIVE | COMMUNITY

## 2024-09-19 RX ORDER — FLUTICASONE PROPIONATE 50 UG/1
USE AS DIRECTED SPRAY, METERED NASAL
Refills: 0 | Status: ACTIVE | COMMUNITY

## 2024-09-19 RX ORDER — ALBUTEROL SULFATE 1.25 MG/3ML
USE 1 UNIT DOSE IN NEBULIZER EVERY 4 TO 6 HOURS AS NEEDED SOLUTION RESPIRATORY (INHALATION)
Refills: 0 | Status: ACTIVE | COMMUNITY

## 2024-09-19 RX ORDER — AMLODIPINE BESYLATE 5 MG/1
1 TABLET TABLET ORAL ONCE A DAY
Status: ACTIVE | COMMUNITY

## 2024-09-19 RX ORDER — ALBUTEROL SULFATE 90 UG/1
INHALE 2 PUFFS FOUR TIMES DAILY AS DIRECTED AEROSOL, METERED RESPIRATORY (INHALATION)
Refills: 0 | Status: ACTIVE | COMMUNITY

## 2024-09-19 RX ORDER — MULTIVITAMIN
TAKE 1 CAPSULE DAILY CAPSULE ORAL
Refills: 0 | Status: ACTIVE | COMMUNITY

## 2024-09-19 RX ORDER — ALPRAZOLAM 1 MG/1
1 TABLET TABLET ORAL THREE TIMES A DAY
Refills: 0 | Status: ACTIVE | COMMUNITY

## 2024-09-19 NOTE — HPI-TODAY'S VISIT:
She was seen in the office in April for follow-up regarding elevated liver enzymes.  Previous serologic workup was negative for viral or autoimmune hepatitis.  Previous CT demonstrated mild intrahepatic duct dilation.  An upper endoscopic ultrasound was planned.  Her abdominal pain and diarrhea related to irritable bowel syndrome had improved with dietary modification. EUS 4/29/2024 by Dr. Cavanaugh:Normal esophagus.  Gastric mucosal atrophy and antral gastritis with mucosal changes suggestive of intestinal metaplasia.  Normal examined duodenum.  Normal ampulla.  No significant pathology in the common bile duct.  Normal postcholecystectomy caliber of the extrahepatic bile duct without stones or sludge.  Nondilated main pancreatic duct.  Diffusely hyperechoic appearance of the liver suggestive of fatty infiltration.  Gastric biopsies showed mild chronic inactive inflammation, negative for H. pylori or intestinal metaplasia. Her diarrhea has been worsening. She is experiencing multiple loose, occasionally urgent stools each day. This typically responds to Imodium AD, which she is having to take more regularly. She reports associated abdominal discomfort and bloating, but denies blood in the stool. She remains on budesonide but cannot recall the dose. She is unsure if she has had any recent medication changes or been on antibiotics. She currently resides at [facility]. Recent labs with her PCP- 9/5/2024:H/H12.9/39.1, MCV 96, , WBC 8.8.  , , , T. bili 0.4, CMP otherwise unremarkable.  TSH 1.09. 6/17/2024:H/H12.5/36.9, MCV 98, , WBC 10.1.  AST 19, ALT 39, , T. bili 0.2, CMP otherwise unremarkable.  TSH 1.020.

## 2024-09-19 NOTE — HPI-OTHER HISTORIES
She had elevated liver tests. She saw Dr. Chen. He stopped her Depakote and Zyprexa. The LFTs normalized.  Hx of longstanding irritable bowel syndrome, previously improved with eliminating lactose.   She had a severe viral viral pneumonitis back in 2010. She had ARDS. She had a trach. Now she is on longstanding oxygen. Her pulmonary function is actually relatively stable. No cough problems. She is on home O2.  January 2024. CMP notable for alkaline phosphatase 308. Total bilirubin 0.5. AST 71, ALT 98.  Labs September 2023. Hemoglobin 13.0. Platelet count 186,000. ALT 31, AST 34. Alkaline phosphatase 67. Total bilirubin 1.0.  September 2023. Carotid ultrasound study negative for carotid artery stenosis.  September 2023. MRI brain normal.  CT scan of pelvis with contrast September 2023. Liver normal. Gallbladder surgically absent. Pancreas normal. No mention of common bile duct dilation.  2023 CT abdomen with contrast. Mild intrahepatic ductal dilation. No intrahepatic mass. Common bile duct 9 mm. No other abnormalities noted.  July 31, 2023. Total bilirubin 0.5. Alk phos is 119, AST 25. TSH 1.2. Hemoglobin 13.0. Platelet count 202,000. . CRP 0.6. Creatinine 1.1. Prothrombin time 12.7.  Labs July 21, 2023. , . Alkaline phosphatase 240. Total bili 1.2.  Abdominal ultrasound June 2023. Normal. Gallbladder surgically absent. Common bile duct 4 mm.  June 1, 2023. Alk phos is 388. .   CT scan abdomen with contrast. August 2023. No acute abdominal findings. 9 mm common bile duct.  July 2023. Labs. Normal IgG at 995. Anti-LK M-. Anti-smooth muscle antibody negative. Hepatitis serologies negative. Antimitochondrial antibody negative.  Abdominal ultrasound July 2023. Unremarkable. Gallbladder surgically absent. Common bile duct 4 mm in size.  Colonoscopy December 2022. 4 distinct 4 mm transverse and cecal polyps. All tubular adenomas. Random biopsies obtained. 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 Ascending biopsies were normal.  Oct 2022. Celiac serology negative.  Labs September 2022. Total bilirubin 0.5, AST 21, ALT 19, alkaline phosphatase 110, hemoglobin 13.8, platelet count 216,000,  Labs July 2022. AST 30, ALT 30, alkaline phosphatase 121.  CT abdomen and pelvis with contrast June 2022. Previous cholecystectomy. Normal common bile duct. Normal liver. Prior hysterectomy noted. Tiny pulmonary nodule.  Labs May 2022. AST 80, , alkaline phosphatase 225, hepatitis serologies negative  CT chest with contrast. June 2022. Focal masslike thickening of the distal esophagus with calcification. 13 mm x 10 mm.  Abdominal ultrasound May 2022. Normal liver. Status post cholecystectomy. Common bile duct normal.  Labs April 2022. AST 82, , alkaline phosphatase 267, TSH 1.2.  Colonoscopy 2008. Normal  EGD 2002. Mild gastritis. Duodenal biopsies performed. Duodenal biopsies normal. Gastritis noted. No H. pylori.  Colonoscopy 1999. Normal. Slight inflammation of terminal ileum and ascending colon. Descending colon biopsies were normal.  Flexible sigmoidoscopy 1998. Normal to 60 cm. Normal mucosa

## 2024-09-26 LAB
A/G RATIO: 1.4
ABSOLUTE BASOPHILS: 30
ABSOLUTE EOSINOPHILS: 121
ABSOLUTE LYMPHOCYTES: 1980
ABSOLUTE MONOCYTES: 525
ABSOLUTE NEUTROPHILS: 7444
ALBUMIN: 4.2
ALKALINE PHOSPHATASE: 133
ALT (SGPT): 20
AST (SGOT): 19
BASOPHILS: 0.3
BILIRUBIN, TOTAL: 0.5
BUN/CREATININE RATIO: (no result)
BUN: 20
CALCIUM: 9.4
CARBON DIOXIDE, TOTAL: 32
CHLORIDE: 99
CK-BB: (no result)
CREATINE KINASE, TOTAL: 33
CREATININE: 1
DNA (DS) ANTIBODY: <1
EGFR: 62
EOSINOPHILS: 1.2
GLOBULIN, TOTAL: 2.9
GLUCOSE: 94
HEMATOCRIT: 38.2
HEMOGLOBIN: 12.6
HISTONE ANTIBODIES: <1
LYMPHOCYTES: 19.6
MCH: 32.5
MCHC: 33
MCV: 98.5
MONOCYTES: 5.2
MPV: 10
NEUTROPHILS: 73.7
PLATELET COUNT: 215
POTASSIUM: 4.5
PROTEIN, TOTAL: 7.1
RDW: 13
RED BLOOD CELL COUNT: 3.88
SODIUM: 141
WHITE BLOOD CELL COUNT: 10.1

## 2024-12-19 ENCOUNTER — LAB OUTSIDE AN ENCOUNTER (OUTPATIENT)
Dept: URBAN - METROPOLITAN AREA CLINIC 113 | Facility: CLINIC | Age: 69
End: 2024-12-19

## 2024-12-19 ENCOUNTER — OFFICE VISIT (OUTPATIENT)
Dept: URBAN - METROPOLITAN AREA CLINIC 113 | Facility: CLINIC | Age: 69
End: 2024-12-19
Payer: MEDICARE

## 2024-12-19 ENCOUNTER — TELEPHONE ENCOUNTER (OUTPATIENT)
Dept: URBAN - METROPOLITAN AREA CLINIC 113 | Facility: CLINIC | Age: 69
End: 2024-12-19

## 2024-12-19 VITALS
BODY MASS INDEX: 32 KG/M2 | HEIGHT: 60 IN | TEMPERATURE: 98.1 F | RESPIRATION RATE: 18 BRPM | SYSTOLIC BLOOD PRESSURE: 116 MMHG | DIASTOLIC BLOOD PRESSURE: 67 MMHG | WEIGHT: 163 LBS | HEART RATE: 51 BPM

## 2024-12-19 DIAGNOSIS — Z80.0 FAMILY HISTORY OF COLON CANCER: ICD-10-CM

## 2024-12-19 DIAGNOSIS — R74.8 ELEVATED LIVER ENZYMES: ICD-10-CM

## 2024-12-19 DIAGNOSIS — K58.9 IRRITABLE BOWEL SYNDROME (IBS): ICD-10-CM

## 2024-12-19 DIAGNOSIS — R10.84 GENERALIZED ABDOMINAL PAIN: ICD-10-CM

## 2024-12-19 DIAGNOSIS — R19.7 DIARRHEA: ICD-10-CM

## 2024-12-19 DIAGNOSIS — K52.831 COLLAGENOUS COLITIS: ICD-10-CM

## 2024-12-19 PROCEDURE — 99214 OFFICE O/P EST MOD 30 MIN: CPT | Performed by: INTERNAL MEDICINE

## 2024-12-19 RX ORDER — ALPRAZOLAM 1 MG/1
1 TABLET TABLET ORAL THREE TIMES A DAY
Refills: 0 | Status: ACTIVE | COMMUNITY

## 2024-12-19 RX ORDER — FLUTICASONE PROPIONATE 50 UG/1
USE AS DIRECTED SPRAY, METERED NASAL
Refills: 0 | Status: ACTIVE | COMMUNITY

## 2024-12-19 RX ORDER — ALBUTEROL SULFATE 1.25 MG/3ML
USE 1 UNIT DOSE IN NEBULIZER EVERY 4 TO 6 HOURS AS NEEDED SOLUTION RESPIRATORY (INHALATION)
Refills: 0 | Status: ACTIVE | COMMUNITY

## 2024-12-19 RX ORDER — ASCORBIC ACID 500 MG
TAKE 1 TABLET DAILY TABLET ORAL
Refills: 0 | Status: ACTIVE | COMMUNITY

## 2024-12-19 RX ORDER — MULTIVITAMIN
TAKE 1 CAPSULE DAILY CAPSULE ORAL
Refills: 0 | Status: ACTIVE | COMMUNITY

## 2024-12-19 RX ORDER — METOPROLOL TARTRATE 50 MG/1
TAKE 1 TABLET DAILY TABLET, FILM COATED ORAL
Refills: 0 | Status: ACTIVE | COMMUNITY

## 2024-12-19 RX ORDER — DICYCLOMINE HYDROCHLORIDE 20 MG/1
1 TABLET TABLET ORAL THREE TIMES A DAY
Status: ACTIVE | COMMUNITY

## 2024-12-19 RX ORDER — ALBUTEROL SULFATE 90 UG/1
INHALE 2 PUFFS FOUR TIMES DAILY AS DIRECTED AEROSOL, METERED RESPIRATORY (INHALATION)
Refills: 0 | Status: ACTIVE | COMMUNITY

## 2024-12-19 RX ORDER — OMEPRAZOLE 20 MG/1
TAKE 1 CAPSULE TWICE A DAY INSURANCE WILL ONLY PAY FOR 60/MONTH CAPSULE, DELAYED RELEASE ORAL
Qty: 120 | Refills: 0 | Status: ACTIVE | COMMUNITY
Start: 2012-01-31

## 2024-12-19 RX ORDER — BUDESONIDE 3 MG/1
TAKE 3 CAPSULES BY MOUTH EVERY DAY, DECREASE TO TWO (2) CAPSULES IN FOUR (4) WEEKS CAPSULE, COATED PELLETS ORAL
Qty: 90 BLISTER | Refills: 2 | Status: ACTIVE | COMMUNITY

## 2024-12-19 RX ORDER — FLUOXETINE HYDROCHLORIDE 10 MG/1
1 CAPSULE CAPSULE ORAL ONCE A DAY
Status: ACTIVE | COMMUNITY

## 2024-12-19 RX ORDER — COLESEVELAM HYDROCHLORIDE 625 MG/1
1 TABLET TABLET, COATED ORAL ONCE DAILY
Qty: 30 | Refills: 11 | OUTPATIENT
Start: 2024-12-19

## 2024-12-19 RX ORDER — AMLODIPINE BESYLATE 5 MG/1
1 TABLET TABLET ORAL ONCE A DAY
Status: ACTIVE | COMMUNITY

## 2024-12-19 RX ORDER — BUDESONIDE AND FORMOTEROL FUMARATE DIHYDRATE 160; 4.5 UG/1; UG/1
USE AS DIRECTED AEROSOL RESPIRATORY (INHALATION)
Refills: 0 | Status: ACTIVE | COMMUNITY

## 2024-12-19 NOTE — HPI-TODAY'S VISIT:
70yo female with a history of ARDS on supplementation oxygen, IBS-D, presenting for follow-up.  At this point, she is not having any issues with abdominal pain. No dysphagia or regurgitation. She still has intermittent diarrhea. Alternating with constipation. It seems of the diarrhea may be a bit better. She was on budesonide in the past. She cannot tell if she is taking the medication or not. I do not have her medications from the nursing home rehab facility. At the time of her last visit we scheduled an echocardiogram and also an MRI. Because of her cane neither of these were done. Fortunately, her liver tests have been normal. And she is relatively asymptomatic.

## 2024-12-19 NOTE — HPI-TODAY'S VISIT:
9/5/2024:H/H12.9/39.1, MCV 96, , WBC 8.8. , , , T. bili 0.4, CMP otherwise unremarkable. TSH 1.09.  6/17/2024:H/H12.5/36.9, MCV 98, , WBC 10.1. AST 19, ALT 39, , T. bili 0.2, CMP otherwise unremarkable. TSH 1.020.  EUS 4/29/2024 by Dr. Cavanaugh:Normal esophagus.  Gastric mucosal atrophy and antral gastritis with mucosal changes suggestive of intestinal metaplasia.  Normal examined duodenum.  Normal ampulla.  No significant pathology in the common bile duct.  Normal postcholecystectomy caliber of the extrahepatic bile duct without stones or sludge.  Nondilated main pancreatic duct.  Diffusely hyperechoic appearance of the liver suggestive of fatty infiltration.  Gastric biopsies showed mild chronic inactive inflammation, negative for H. pylori or intestinal metaplasia.  She currently resides at [facility].